# Patient Record
Sex: MALE | Race: WHITE | Employment: STUDENT | URBAN - METROPOLITAN AREA
[De-identification: names, ages, dates, MRNs, and addresses within clinical notes are randomized per-mention and may not be internally consistent; named-entity substitution may affect disease eponyms.]

---

## 2024-08-28 ENCOUNTER — OFFICE VISIT (OUTPATIENT)
Age: 19
End: 2024-08-28

## 2024-08-28 VITALS
DIASTOLIC BLOOD PRESSURE: 82 MMHG | SYSTOLIC BLOOD PRESSURE: 100 MMHG | RESPIRATION RATE: 18 BRPM | HEART RATE: 94 BPM | OXYGEN SATURATION: 98 % | TEMPERATURE: 98.2 F

## 2024-08-28 DIAGNOSIS — H65.93 OME (OTITIS MEDIA WITH EFFUSION), BILATERAL: ICD-10-CM

## 2024-08-28 DIAGNOSIS — R05.2 SUBACUTE COUGH: Primary | ICD-10-CM

## 2024-08-28 PROBLEM — K90.0 CELIAC DISEASE: Status: ACTIVE | Noted: 2023-05-31

## 2024-08-28 RX ORDER — BROMPHENIRAMINE MALEATE, PSEUDOEPHEDRINE HYDROCHLORIDE, AND DEXTROMETHORPHAN HYDROBROMIDE 2; 30; 10 MG/5ML; MG/5ML; MG/5ML
5 SYRUP ORAL 3 TIMES DAILY PRN
Qty: 118 ML | Refills: 0
Start: 2024-08-28

## 2024-08-28 ASSESSMENT — ENCOUNTER SYMPTOMS
DIARRHEA: 0
ABDOMINAL PAIN: 0
TROUBLE SWALLOWING: 0
RHINORRHEA: 0
VOMITING: 0
CHEST TIGHTNESS: 0
VOICE CHANGE: 0
SORE THROAT: 0
SHORTNESS OF BREATH: 0
NAUSEA: 0
COUGH: 1

## 2024-08-28 NOTE — PROGRESS NOTES
Appearance: Normal appearance.   HENT:      Head: Normocephalic and atraumatic.      Jaw: No trismus.      Right Ear: Hearing and ear canal normal. A middle ear effusion is present.      Left Ear: Hearing and ear canal normal. A middle ear effusion is present.      Nose: Nose normal.      Mouth/Throat:      Lips: Pink.      Mouth: Mucous membranes are moist.      Pharynx: Oropharynx is clear. Uvula midline. No pharyngeal swelling, oropharyngeal exudate, posterior oropharyngeal erythema or postnasal drip.      Tonsils: No tonsillar exudate.   Cardiovascular:      Rate and Rhythm: Normal rate and regular rhythm.   Pulmonary:      Effort: Pulmonary effort is normal. No respiratory distress.      Breath sounds: Normal breath sounds.   Neurological:      Mental Status: He is alert and oriented to person, place, and time.   Psychiatric:         Mood and Affect: Mood normal.         Behavior: Behavior normal.          No results found for this visit on 08/28/24.       ASSESSMENT and PLAN         Diagnosis Orders   1. Subacute cough  XR CHEST PA LAT (2 VIEWS)    brompheniramine-pseudoephedrine-DM 2-30-10 MG/5ML syrup      2. OME (otitis media with effusion), bilateral  [facility]            Recommendations:   Cough: Discussed no concerns during exam for PNEUMONIA  but ordered chest x-ray due to patient concerns for PNEUMONIA . Rxed Bromfed for cough- sedation precautions given. Use q HS as needed during school week and TID as needed during weekend if will be in dorm room. Hydrate. Advised coughs can persist for 1-3 months at times. Reviewed signs and symptoms PNEUMONIA  and action to take/follow up stat.     OME: suggested flonase daily for 2-4 weeks and claritin daily. No indication for antibiotic and he has just completed 3 rounds of antibiotic and 2 rounds of oral steroids. Referred to ENT per patient request.             Follow up: Patient was encouraged to return to the clinic and/or PCP if s/s